# Patient Record
Sex: MALE | Race: WHITE | NOT HISPANIC OR LATINO | URBAN - METROPOLITAN AREA
[De-identification: names, ages, dates, MRNs, and addresses within clinical notes are randomized per-mention and may not be internally consistent; named-entity substitution may affect disease eponyms.]

---

## 2023-09-18 ENCOUNTER — HOSPITAL ENCOUNTER (OUTPATIENT)
Dept: DATA CONVERSION | Facility: HOSPITAL | Age: 28
Discharge: HOME | End: 2023-09-18

## 2023-09-18 DIAGNOSIS — F17.210 NICOTINE DEPENDENCE, CIGARETTES, UNCOMPLICATED: ICD-10-CM

## 2023-09-18 DIAGNOSIS — R07.89 OTHER CHEST PAIN: ICD-10-CM

## 2023-09-18 LAB
ALBUMIN SERPL-MCNC: 4.8 GM/DL (ref 3.5–5)
ALBUMIN/GLOB SERPL: 1.7 RATIO (ref 1.5–3)
ALP BLD-CCNC: 109 U/L (ref 35–125)
ALT SERPL-CCNC: 74 U/L (ref 5–40)
ANION GAP SERPL CALCULATED.3IONS-SCNC: 11 MMOL/L (ref 0–19)
AST SERPL-CCNC: 22 U/L (ref 5–40)
BASOPHILS # BLD AUTO: 0.02 K/UL (ref 0–0.22)
BASOPHILS NFR BLD AUTO: 0.3 % (ref 0–1)
BILIRUB SERPL-MCNC: 0.4 MG/DL (ref 0.1–1.2)
BUN SERPL-MCNC: 12 MG/DL (ref 8–25)
BUN/CREAT SERPL: 13.3 RATIO (ref 8–21)
CALCIUM SERPL-MCNC: 9.4 MG/DL (ref 8.5–10.4)
CHLORIDE SERPL-SCNC: 103 MMOL/L (ref 97–107)
CO2 SERPL-SCNC: 26 MMOL/L (ref 24–31)
CREAT SERPL-MCNC: 0.9 MG/DL (ref 0.4–1.6)
DEPRECATED RDW RBC AUTO: 38.4 FL (ref 37–54)
DIFFERENTIAL METHOD BLD: NORMAL
EOSINOPHIL # BLD AUTO: 0.07 K/UL (ref 0–0.45)
EOSINOPHIL NFR BLD: 0.9 % (ref 0–3)
ERYTHROCYTE [DISTWIDTH] IN BLOOD BY AUTOMATED COUNT: 12.7 % (ref 11.7–15)
GFR SERPL CREATININE-BSD FRML MDRD: 118 ML/MIN/1.73 M2
GLOBULIN SER-MCNC: 2.8 G/DL (ref 1.9–3.7)
GLUCOSE SERPL-MCNC: 94 MG/DL (ref 65–99)
HCT VFR BLD AUTO: 44.9 % (ref 41–50)
HGB BLD-MCNC: 15.5 GM/DL (ref 13.5–16.5)
HS TROPONIN T DELTA: NORMAL (ref 0–4)
IMM GRANULOCYTES # BLD AUTO: 0.02 K/UL (ref 0–0.1)
LIPASE SERPL-CCNC: 27 U/L (ref 16–63)
LYMPHOCYTES # BLD AUTO: 2.8 K/UL (ref 1.2–3.2)
LYMPHOCYTES NFR BLD MANUAL: 36.6 % (ref 20–40)
MCH RBC QN AUTO: 28.8 PG (ref 26–34)
MCHC RBC AUTO-ENTMCNC: 34.5 % (ref 31–37)
MCV RBC AUTO: 83.3 FL (ref 80–100)
MONOCYTES # BLD AUTO: 0.49 K/UL (ref 0–0.8)
MONOCYTES NFR BLD MANUAL: 6.4 % (ref 0–8)
NEUTROPHILS # BLD AUTO: 4.24 K/UL
NEUTROPHILS # BLD AUTO: 4.24 K/UL (ref 1.8–7.7)
NEUTROPHILS.IMMATURE NFR BLD: 0.3 % (ref 0–1)
NEUTS SEG NFR BLD: 55.5 % (ref 50–70)
NRBC BLD-RTO: 0 /100 WBC
PLATELET # BLD AUTO: 240 K/UL (ref 150–450)
PMV BLD AUTO: 9.6 CU (ref 7–12.6)
POTASSIUM SERPL-SCNC: 3.6 MMOL/L (ref 3.4–5.1)
PROT SERPL-MCNC: 7.6 G/DL (ref 5.9–7.9)
RBC # BLD AUTO: 5.39 M/UL (ref 4.5–5.5)
SODIUM SERPL-SCNC: 140 MMOL/L (ref 133–145)
TROPONIN T SERPL-MCNC: 6 NG/L
WBC # BLD AUTO: 7.6 K/UL (ref 4.5–11)

## 2024-11-20 ENCOUNTER — APPOINTMENT (OUTPATIENT)
Dept: ORTHOPEDIC SURGERY | Facility: CLINIC | Age: 29
End: 2024-11-20
Payer: COMMERCIAL

## 2025-01-30 ENCOUNTER — HOSPITAL ENCOUNTER (OUTPATIENT)
Dept: RADIOLOGY | Facility: HOSPITAL | Age: 30
Discharge: HOME | End: 2025-01-30
Payer: COMMERCIAL

## 2025-01-30 ENCOUNTER — OFFICE VISIT (OUTPATIENT)
Dept: ORTHOPEDIC SURGERY | Facility: HOSPITAL | Age: 30
End: 2025-01-30
Payer: COMMERCIAL

## 2025-01-30 VITALS — WEIGHT: 240 LBS | HEIGHT: 67 IN | BODY MASS INDEX: 37.67 KG/M2

## 2025-01-30 DIAGNOSIS — S83.231A COMPLEX TEAR OF MEDIAL MENISCUS OF RIGHT KNEE, INITIAL ENCOUNTER: Primary | ICD-10-CM

## 2025-01-30 DIAGNOSIS — M25.561 ACUTE PAIN OF RIGHT KNEE: ICD-10-CM

## 2025-01-30 PROCEDURE — 99214 OFFICE O/P EST MOD 30 MIN: CPT | Performed by: PHYSICIAN ASSISTANT

## 2025-01-30 PROCEDURE — 3008F BODY MASS INDEX DOCD: CPT | Performed by: PHYSICIAN ASSISTANT

## 2025-01-30 PROCEDURE — 99204 OFFICE O/P NEW MOD 45 MIN: CPT | Performed by: PHYSICIAN ASSISTANT

## 2025-01-30 PROCEDURE — 1036F TOBACCO NON-USER: CPT | Performed by: PHYSICIAN ASSISTANT

## 2025-01-30 PROCEDURE — 73564 X-RAY EXAM KNEE 4 OR MORE: CPT | Mod: RT

## 2025-01-30 NOTE — PROGRESS NOTES
"Subjective    Patient ID: Jeff Hernandez is a 29 y.o. male.    Chief Complaint: Pain of the Right Knee           HPI:  Jeff Hernandez is a 29 y.o. male who presents today for evaluation of his right knee.  He states that he has had longstanding problems with his right knee but cannot recall specific remote injury or trauma.  A week ago though he was in a squatting position and when he went to stand he felt a \"pop\" in his knee.  He had immediate sharp pain over the medial aspect.  Within a few hours after the injury he had noted the onset of swelling.  He began icing and using ibuprofen.  He states in the past this typically helped within a couple days but unfortunately his symptoms have persisted.  He notes a feeling of instability with day-to-day activities.  No prior history of surgery to either knee.  His left knee is currently asymptomatic.    ROS  Constitutional: No fever, no chills, not feeling tired, no recent weight gain and no recent weight loss  ENT: No nosebleeds  Cardiovascular: No chest pain  Respiratory: No shortness of breath and no cough  Gastrointestinal: No abdominal pain, no nausea, no diarrhea, and no vomiting  Musculoskeletal: No arthralgias  Integumentary: No rashes and no skin lesions  Neurological: No headache  Psychiatric: No sleep disturbances no depression  Endocrine: No muscle weakness and no muscle cramps  Hematologic/lymphatic: No swelling glands and no tendency for easy bruising    History reviewed. No pertinent past medical history.     History reviewed. No pertinent surgical history.     No current outpatient medications on file.     Allergies   Allergen Reactions    Naproxen Swelling        Social Connections: Not on file          Objective   29-year-old male well appearing in no acute distress. Alert and oriented ×3.  Skin intact bilateral lower extremities.   Normal tandem gait. Coordination and balance intact.  Bilateral lower extremity compartments supple.  5 out of 5 distal motor " strength bilaterally.  L4 through S1 sensation intact bilaterally.  2+ DP/PT pulses bilaterally.  Mild effusion to the right knee.  No warmth or erythema.  He can perform an isometric quad contraction and straight leg raise bilaterally.  Active range of motion of the right knee is 0 to 90 degrees, left knee 0 to 130 degrees.  The right knee is negative varus and valgus stress.  Negative Lachman's.  Tenderness along the medial joint line with positive Angelina's.  Left knee with no tenderness.  Negative varus and valgus stress.  Negative Lachman's.  Negative Angelina's.      Image Results:  X-rays of the right knee taken today in the office were reviewed.  These were negative for fracture or dislocation.  Joint space well-maintained.      Assessment/Plan   Encounter Diagnoses:  Complex tear of medial meniscus of right knee, initial encounter    Acute pain of right knee    Orders Placed This Encounter    XR knee right 4+ views    MR knee right wo IV contrast       I recommended that we proceed with an MRI of the right knee to evaluate the medial meniscus.  The MRI is medically necessary given his subjective complaints and physical exam findings including a positive Angelina's.  The MRI should be performed in a hospital setting so the surgeon has access to the images as it will be used for potential presurgical planning purposes.  We did briefly reviewed the possibility for right knee arthroscopic partial medial meniscectomy today.  Appropriate activity and restrictions were reviewed.  Continue with ibuprofen, ice, and compression sleeve to help reduce swelling.  After the MRI has been completed he will contact the office and we will review the results with him by phone.    This office note was dictated using Dragon voice to text software and was not proofread for spelling or grammatical errors

## 2025-02-06 ENCOUNTER — HOSPITAL ENCOUNTER (OUTPATIENT)
Dept: RADIOLOGY | Facility: HOSPITAL | Age: 30
Discharge: HOME | End: 2025-02-06
Payer: COMMERCIAL

## 2025-02-06 DIAGNOSIS — S83.231A COMPLEX TEAR OF MEDIAL MENISCUS OF RIGHT KNEE, INITIAL ENCOUNTER: ICD-10-CM

## 2025-02-06 PROCEDURE — 73721 MRI JNT OF LWR EXTRE W/O DYE: CPT | Mod: RT

## 2025-02-07 ENCOUNTER — DOCUMENTATION (OUTPATIENT)
Dept: ORTHOPEDIC SURGERY | Facility: HOSPITAL | Age: 30
End: 2025-02-07
Payer: COMMERCIAL

## 2025-02-07 DIAGNOSIS — M25.561 ACUTE PAIN OF RIGHT KNEE: Primary | ICD-10-CM

## 2025-02-07 NOTE — PROGRESS NOTES
I spoke to the patient regarding the MRI on his right knee.  He states he has seen some improvement over the last week or so.  He has naproxen listed as an allergy in his chart.  He states that his lip swelled when he took Aleve.  He has been taking ibuprofen recently and not having any issues.    The MRI did not show any tears to either meniscus, the cruciate ligaments or collateral ligaments.  He does have an area of cartilage thinning on the medial femoral condyle and the posterior weightbearing zone.  No full-thickness defect is noted.  No marrow edema either.  No obvious loose bodies present.    I recommended that we try to treat this conservatively.  He is going to increase ibuprofen to 400 mg 3 times a day.  In the gym he should avoid squats and lunges for now.  He is referred to physical therapy to mainly focus on a flexibility program.    I will review his MRI with Dr. Lopez.  We may consider intra-articular steroid injection or viscosupplementation in the future if he is not seeing any improvements.  The patient verbalized understanding and agreed with this plan.